# Patient Record
(demographics unavailable — no encounter records)

---

## 2024-11-26 NOTE — HISTORY OF PRESENT ILLNESS
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 She denies balance problem, dizziness, or any other focal neuro deficits.   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 Referred to neuro sx for these findings.  As per patient, she has been seen neurology since 2015 for schwannoma evaluation.

## 2024-11-26 NOTE — HISTORY OF PRESENT ILLNESS
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 She denies balance problem, dizziness, or any other focal neuro deficits.   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 Referred to neuro sx for these findings.  As per patient, she has been seen neurology since 2015 for schwannoma evaluation.

## 2024-11-26 NOTE — DATA REVIEWED
[de-identified] : IAC w/wo on 10/7/2024 @ [facility]  multilobulated residual enhancing lesions within the right internal auditory canal/CPA measuring 1.3 x 0.7 x 0.6 cm slightly decreased in size compared to previous MRI in 2016 [de-identified] : head/neck wo on 10/11/24 @ [facility]  patent arterial vasculature of the head and neck. No high grade stenosis, occlusion. No evidence of dissection, malformation or aneurysm. [de-identified] : MRV on 10/29/24 @ [facility] no evidence of flow related signal contrast enhancement within the right sigmoid sinus and right internal jugular vein. Diminutive caliber of the right transverse sinus given associated postoperative changes, these findings may reflect postoperative sinus occlusion. This siminutive appearance appears to be stable dating back to 2017.

## 2024-11-26 NOTE — ASSESSMENT
[FreeTextEntry1] : discussed image finding, cerebral angiogram can be better visualization but given history of iodine contrast allergic reaction, can be avoided at this time.  PLAN - repeat MRI brain w/wo in one year - vestibular therapy for symptoms relief - f/u after images to review  I, Dr. Scotty Paulson, personally performed the evaluation and management (E/M) services for this new patient. That E/M includes conducting the clinically appropriate initial history &/or exam, assessing all conditions, and establishing the plan of care. Today, my CLYDE, Hyunchu Lupe-Gold, was here to observe my evaluation and management service for this patient & follow plan of care established by me going forward.

## 2024-11-26 NOTE — HISTORY OF PRESENT ILLNESS
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 She denies balance problem, dizziness, or any other focal neuro deficits.   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 Referred to neuro sx for these findings.  As per patient, she has been seen neurology since 2015 for schwannoma evaluation.

## 2024-11-26 NOTE — ASSESSMENT
[FreeTextEntry1] : discussed image finding, cerebral angiogram can be better visualization but given history of iodine contrast allergic reaction, can be avoided at this time.  PLAN - repeat MRI brain w/wo in one year - vestibular therapy for symptoms relief - f/u after images to review  I, Dr. Scotty Paulson, personally performed the evaluation and management (E/M) services for this new patient. That E/M includes conducting the clinically appropriate initial history &/or exam, assessing all conditions, and establishing the plan of care. Today, my LCYDE, Hyunchu Lupe-Gold, was here to observe my evaluation and management service for this patient & follow plan of care established by me going forward.

## 2024-11-26 NOTE — ADDENDUM
[FreeTextEntry1] : Pt s/p resection of right vestibular schwannoma x 2 and radiosurgery. 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 Plan for annual MRI to follow residual vestibular schwannoma.

## 2024-11-26 NOTE — DATA REVIEWED
[de-identified] : IAC w/wo on 10/7/2024 @ [facility]  multilobulated residual enhancing lesions within the right internal auditory canal/CPA measuring 1.3 x 0.7 x 0.6 cm slightly decreased in size compared to previous MRI in 2016 [de-identified] : head/neck wo on 10/11/24 @ [facility]  patent arterial vasculature of the head and neck. No high grade stenosis, occlusion. No evidence of dissection, malformation or aneurysm. [de-identified] : MRV on 10/29/24 @ [facility] no evidence of flow related signal contrast enhancement within the right sigmoid sinus and right internal jugular vein. Diminutive caliber of the right transverse sinus given associated postoperative changes, these findings may reflect postoperative sinus occlusion. This siminutive appearance appears to be stable dating back to 2017.